# Patient Record
Sex: MALE | Race: WHITE | NOT HISPANIC OR LATINO | Employment: UNEMPLOYED | URBAN - METROPOLITAN AREA
[De-identification: names, ages, dates, MRNs, and addresses within clinical notes are randomized per-mention and may not be internally consistent; named-entity substitution may affect disease eponyms.]

---

## 2018-08-27 ENCOUNTER — HOSPITAL ENCOUNTER (EMERGENCY)
Facility: HOSPITAL | Age: 53
Discharge: HOME | End: 2018-08-27
Attending: FAMILY MEDICINE
Payer: MEDICAID

## 2018-08-27 VITALS
WEIGHT: 242.19 LBS | HEIGHT: 72 IN | SYSTOLIC BLOOD PRESSURE: 169 MMHG | TEMPERATURE: 98 F | BODY MASS INDEX: 32.8 KG/M2 | OXYGEN SATURATION: 97 % | RESPIRATION RATE: 20 BRPM | DIASTOLIC BLOOD PRESSURE: 93 MMHG | HEART RATE: 56 BPM

## 2018-08-27 DIAGNOSIS — M79.672 BILATERAL FOOT PAIN: ICD-10-CM

## 2018-08-27 DIAGNOSIS — M79.671 BILATERAL FOOT PAIN: ICD-10-CM

## 2018-08-27 DIAGNOSIS — L60.8 NAIL DISCOLORATION: Primary | ICD-10-CM

## 2018-08-27 PROCEDURE — 99283 EMERGENCY DEPT VISIT LOW MDM: CPT

## 2018-08-27 NOTE — ED PROVIDER NOTES
SCRIBE #1 NOTE: I, Sudeep Mathis, am scribing for, and in the presence of, Frankie Martinez PA-C. I have scribed the entire note.      History      Chief Complaint   Patient presents with    Wound Check     reports being diabetic x 15 yrs, reports bilateral toes are darkish blue colored        Review of patient's allergies indicates:  No Known Allergies     HPI   HPI    8/27/2018, 3:41 PM   History obtained from the patient      History of Present Illness: Adalberto Mccall is a 52 y.o. male patient with a hx of DM who presents to the Emergency Department for a wound check which onset gradually a few days ago. Pt reports a few weeks ago he noticed a black/blue coloring to his toes. Pt reports a hx of issues with neuropathy. Pt states he decided to come in for further evaluation. Symptoms are constant and moderate in severity. Exacerbated by nothing and relieved by nothing. No associated sxs. Patient denies any fever, chills, CP,SOB, trauma, gait problem, weakness/numbness, and all other sxs at this time. Pt denies tx PTA. No further complaints or concerns at this time.         Arrival mode: Personal vehicle    PCP: Corbin Cardoso MD       Past Medical History:  No past medical history on file.    Past Surgical History:  No past surgical history on file.      Family History:  No family history on file.    Social History:  Social History     Tobacco Use    Smoking status: Not on file   Substance and Sexual Activity    Alcohol use: Not on file    Drug use: Not on file    Sexual activity: Not on file       ROS   Review of Systems   Constitutional: Negative for chills and fever.   HENT: Negative for congestion, sore throat and trouble swallowing.    Respiratory: Negative for cough and shortness of breath.    Cardiovascular: Negative for chest pain.   Gastrointestinal: Negative for abdominal pain, nausea and vomiting.   Genitourinary: Negative for dysuria, frequency, hematuria and urgency.   Musculoskeletal:  Negative for back pain, gait problem, joint swelling and neck pain.        (-) Trauma   Skin: Negative for pallor, rash and wound.        (+) Blue/black coloring of toes bilaterally  (-) Increased warmth  (-) Erythema  (-) Swelling to the area   (-) Drainage from the site   Neurological: Negative for weakness, light-headedness, numbness and headaches.        (-)Tingling  (-) Paresthesia     Physical Exam      Initial Vitals [08/27/18 1520]   BP Pulse Resp Temp SpO2   (!) 169/93 (!) 56 20 98.2 °F (36.8 °C) 97 %      MAP       --          Physical Exam  Nursing Notes and Vital Signs Reviewed.  Constitutional: Patient is in no acute distress. Well-developed and well-nourished.  Head: Atraumatic. Normocephalic.  Eyes: PERRL. EOM intact. Conjunctivae are not pale. No scleral icterus.  ENT: Mucous membranes are moist. Oropharynx is clear and symmetric.    Neck: Supple. Full ROM. No lymphadenopathy.  Cardiovascular: Regular rate. Regular rhythm.  Pulmonary/Chest: No respiratory distress. Clear to auscultation bilaterally.  Abdominal: Soft and non-distended.  There is no tenderness.   Musculoskeletal: Moves all extremities. No obvious deformities. Blue/green discoloration noted to bilateral feet. No bruising, swelling, necrosis, erythema, or other signs of trauma.   Skin: Warm and dry.  Neurological:  Alert, awake, and appropriate.  Normal speech.  No acute focal neurological deficits are appreciated.  Psychiatric: Normal affect. Good eye contact. Appropriate in content.    ED Course    Procedures  ED Vital Signs:  Vitals:    08/27/18 1520   BP: (!) 169/93   Pulse: (!) 56   Resp: 20   Temp: 98.2 °F (36.8 °C)   TempSrc: Oral   SpO2: 97%   Weight: 109.8 kg (242 lb 2.8 oz)   Height: 6' (1.829 m)       Imaging Results:  Imaging Results          US Lower Extremity Arteries Bilateral (Final result)  Result time 08/27/18 16:50:18    Final result by Vern Turner MD (08/27/18 16:50:18)                 Impression:      No  hemodynamically significant stenosis demonstrated in the right or left lower extremity arterial system.      Electronically signed by: Vern Turner MD  Date:    08/27/2018  Time:    16:50             Narrative:    EXAMINATION:  US LOWER EXTREMITY ARTERIES BILATERAL    CLINICAL HISTORY:  Pain in right foot    TECHNIQUE:  Bilateral lower extremity arterial duplex ultrasound examination performed. Multiple gray scale and color doppler images were obtained in addition to waveform analysis.    COMPARISON:  None    FINDINGS:  The peak systolic velocities on the right are as follows, in centimeters/second:    Common femoral artery: 146    Superficial femoral artery, proximal: 89    Superficial femoral artery, distal: 106/83    Popliteal artery: 63    Peroneal artery: 134    Dorsalis pedis artery: 35    Anterior tibial artery: 91    The peak systolic velocities on the left are as follows, in centimeters/second:    Common femoral artery: 126    Superficial femoral artery, proximal: 84    Superficial femoral artery, distal: 95.7/76    Popliteal artery: 66    Posterior tibial artery: 98    Dorsalis pedis artery: 27    Anterior tibial artery: 47    Normal arterial waveforms are demonstrated.                                        The Emergency Provider reviewed the vital signs and test results, which are outlined above.    ED Discussion     5:02 PM: Reassessed pt at this time. Pt is awake, alert, and in NAD. Pt states his condition has improved at this time. Discussed with pt all pertinent ED information and results. Discussed pt dx and plan of tx. Gave pt all f/u and return to the ED instructions. All questions and concerns were addressed at this time. Pt expresses understanding of information and instructions, and is comfortable with plan to discharge. Pt is stable for discharge.    I discussed with patient and/or family/caretaker that evaluation in the ED does not suggest any emergent or life threatening medical conditions  requiring immediate intervention beyond what was provided in the ED, and I believe patient is safe for discharge.  Regardless, an unremarkable evaluation in the ED does not preclude the development or presence of a serious of life threatening condition. As such, patient was instructed to return immediately for any worsening or change in current symptoms.      ED Medication(s):  Medications - No data to display    New Prescriptions    No medications on file       Follow-up Information     Corbin Cardoso MD. Go in 2 days.    Specialty:  Endocrinology  Contact information:  [address] [place] [id number]                     Medical Decision Making    Medical Decision Making:   Clinical Tests:   Radiological Study: Ordered and Reviewed           Scribe Attestation:   Scribe #1: I performed the above scribed service and the documentation accurately describes the services I performed. I attest to the accuracy of the note.    Attending:   Physician Attestation Statement for Scribe #1: I, Frankie Martinez PA-C, personally performed the services described in this documentation, as scribed by Sudeep Mathis, in my presence, and it is both accurate and complete.          Clinical Impression       ICD-10-CM ICD-9-CM   1. Nail discoloration L60.8 703.8   2. Bilateral foot pain M79.671 729.5    M79.672        Disposition:   Disposition: Discharged  Condition: Stable         RAD Brady  08/27/18 9418

## 2018-08-27 NOTE — ED NOTES
Bed: [facility]  Expected date:   Expected time:   Means of arrival:   Comments:     Marcelo Cm RN  08/27/18 5660

## 2019-02-21 ENCOUNTER — HOSPITAL ENCOUNTER (EMERGENCY)
Facility: HOSPITAL | Age: 54
Discharge: HOME | End: 2019-02-21
Attending: EMERGENCY MEDICINE
Payer: MEDICAID

## 2019-02-21 VITALS
HEIGHT: 72 IN | SYSTOLIC BLOOD PRESSURE: 153 MMHG | WEIGHT: 240.75 LBS | HEART RATE: 86 BPM | BODY MASS INDEX: 32.61 KG/M2 | DIASTOLIC BLOOD PRESSURE: 87 MMHG | OXYGEN SATURATION: 96 % | RESPIRATION RATE: 16 BRPM | TEMPERATURE: 100 F

## 2019-02-21 DIAGNOSIS — J32.9 SINUSITIS, UNSPECIFIED CHRONICITY, UNSPECIFIED LOCATION: Primary | ICD-10-CM

## 2019-02-21 LAB
INFLUENZA A, MOLECULAR: NEGATIVE
INFLUENZA B, MOLECULAR: NEGATIVE
SPECIMEN SOURCE: NORMAL

## 2019-02-21 PROCEDURE — 99284 EMERGENCY DEPT VISIT MOD MDM: CPT

## 2019-02-21 PROCEDURE — 87502 INFLUENZA DNA AMP PROBE: CPT

## 2019-02-21 RX ORDER — AMOXICILLIN AND CLAVULANATE POTASSIUM 875; 125 MG/1; MG/1
1 TABLET, FILM COATED ORAL 2 TIMES DAILY
Qty: 20 TABLET | Refills: 0 | OUTPATIENT
Start: 2019-02-21 | End: 2019-08-10

## 2019-02-21 RX ORDER — METHYLPREDNISOLONE 4 MG/1
TABLET ORAL
Qty: 1 PACKAGE | Refills: 0 | OUTPATIENT
Start: 2019-02-21 | End: 2019-08-10

## 2019-02-21 RX ORDER — NEOMYCIN SULFATE, POLYMYXIN B SULFATE, HYDROCORTISONE 3.5; 10000; 1 MG/ML; [USP'U]/ML; MG/ML
4 SOLUTION/ DROPS AURICULAR (OTIC) 3 TIMES DAILY
Qty: 1 BOTTLE | Refills: 0 | Status: SHIPPED | OUTPATIENT
Start: 2019-02-21 | End: 2019-03-03

## 2019-02-22 NOTE — ED PROVIDER NOTES
"SCRIBE #1 NOTE: I, Damien Espinal, am scribing for, and in the presence of, RAD Willis. I have scribed the entire note.       History     Chief Complaint   Patient presents with    Otalgia     Pain for about 1 week, now with fever and congestion.     Review of patient's allergies indicates:  No Known Allergies      History of Present Illness     HPI    2/21/2019, 7:10 PM  History obtained from the patient      History of Present Illness: Adalberto Menendez is a 53 y.o. male patient who presents to the Emergency Department for evaluation of bilateral ear pain which onset gradually about x1 week ago. Pt states "it feels like I have water in my ears." Symptoms are constant and moderate in severity. No mitigating or exacerbating factors reported. Associated sxs include fever (Temp 101 this morning), diaphoresis, nasal congestion, rhinorrhea, and sinus pressure, which all onset gradually x2 days ago. Patient denies any ear discharge, sneezing, sore throat, SOB, cough, n/v/d, HA, generalized myalgias, and all other sxs at this time. No further complaints or concerns at this time.         Arrival mode: Personal vehicle     PCP: Corbin Cardoso MD        Past Medical History:  History reviewed. No pertinent medical history.       Past Surgical History:  History reviewed. No pertinent surgical history.        Family History:  History reviewed. No pertinent family history.       Social History:  Social History     Tobacco Use    Smoking status: Unknown   Substance and Sexual Activity    Alcohol use: Unknown    Drug use: Unknown    Sexual activity: Unknown        Review of Systems     Review of Systems   Constitutional: Positive for diaphoresis and fever (101).   HENT: Positive for congestion, ear pain (bilateral), rhinorrhea and sinus pressure. Negative for ear discharge, facial swelling, sneezing, sore throat and trouble swallowing.    Eyes: Negative for photophobia and visual disturbance.   Respiratory: Negative for " cough, shortness of breath and wheezing.    Cardiovascular: Negative for chest pain.   Gastrointestinal: Negative for abdominal pain, diarrhea, nausea and vomiting.   Genitourinary: Negative for dysuria, flank pain and frequency.   Musculoskeletal: Negative for back pain and myalgias (generalized).   Skin: Negative for rash.   Neurological: Negative for dizziness, weakness, light-headedness, numbness and headaches.   Hematological: Does not bruise/bleed easily.   All other systems reviewed and are negative.       Physical Exam     Initial Vitals [02/21/19 1907]   BP Pulse Resp Temp SpO2   (!) 153/87 86 16 99.5 °F (37.5 °C) 96 %      MAP       --          Physical Exam  Nursing Notes and Vital Signs Reviewed.  Constitutional: Patient is in no acute distress. Well-developed and well-nourished.  Head: Atraumatic. Normocephalic.  Eyes: PERRL. EOM intact. Conjunctivae are not pale. No scleral icterus.  Ears: Right TM normal. Left TM normal. No erythema. No bulging. Middle effusion on R ear. No erythema. No perforation.   Nose: Patent nares. Turbinates are normal. Maxillary sinus tenderness bilaterally. Rhinorrhea noted.   Throat: Moist mucous membranes. Posterior oropharynx is symmetric without erythema. Tonsillar exudate is not present. No trismus. Normal handling of secretions. No stridor.  Neck: Supple. Full ROM. No lymphadenopathy.  Cardiovascular: Regular rate. Regular rhythm. No murmurs, rubs, or gallops. Distal pulses are 2+ and symmetric.  Pulmonary/Chest: No respiratory distress. Clear to auscultation bilaterally. No wheezing or rales.  Abdominal: Soft and non-distended.  There is no tenderness.  No rebound, guarding, or rigidity. Good bowel sounds.  Genitourinary: No CVA tenderness  Musculoskeletal: Moves all extremities. No obvious deformities. No edema. No calf tenderness.  Skin: Warm and dry.  Neurological:  Alert, awake, and appropriate.  Normal speech.  No acute focal neurological deficits are  appreciated.  Psychiatric: Normal affect. Good eye contact. Appropriate in content.     ED Course   Procedures  ED Vital Signs:  Vitals:    02/21/19 1907   BP: (!) 153/87   Pulse: 86   Resp: 16   Temp: 99.5 °F (37.5 °C)   TempSrc: Oral   SpO2: 96%   Weight: 109.2 kg (240 lb 11.9 oz)   Height: 6' (1.829 m)       Abnormal Lab Results:  Labs Reviewed   INFLUENZA A & B BY MOLECULAR        All Lab Results:  Results for orders placed or performed during the hospital encounter of 02/21/19   Influenza A & B by Molecular   Result Value Ref Range    Influenza A, Molecular Negative Negative    Influenza B, Molecular Negative Negative    Flu A & B Source Nasal swab               The Emergency Provider reviewed the vital signs and test results, which are outlined above.     ED Discussion     8:11 PM: Reassessed pt at this time. Discussed with pt all pertinent ED information and results. Discussed pt dx and plan of tx. Gave pt all f/u and return to the ED instructions. All questions and concerns were addressed at this time. Pt expresses understanding of information and instructions, and is comfortable with plan to discharge. Pt is stable for discharge.    I discussed with patient and/or family/caretaker that evaluation in the ED does not suggest any emergent or life threatening medical conditions requiring immediate intervention beyond what was provided in the ED, and I believe patient is safe for discharge.  Regardless, an unremarkable evaluation in the ED does not preclude the development or presence of a serious of life threatening condition. As such, patient was instructed to return immediately for any worsening or change in current symptoms.      New Prescriptions    AMOXICILLIN-CLAVULANATE 875-125MG (AUGMENTIN) 875-125 MG PER TABLET    Take 1 tablet by mouth 2 (two) times daily.    METHYLPREDNISOLONE (MEDROL DOSEPACK) 4 MG TABLET    Use as directed       Follow-up Information     Corbin Cardoso MD In 1 week.     Specialty:  Endocrinology  Why:  If symptoms worsen  Contact information:  [address] [facility] [address]  [facility] [address]                         Medical Decision Making:   Clinical Tests:   Lab Tests: Ordered and Reviewed             Scribe Attestation:   Scribe #1: I performed the above scribed service and the documentation accurately describes the services I performed. I attest to the accuracy of the note.     Attending:   Physician Attestation Statement for Scribe #1: I, RAD Willis, personally performed the services described in this documentation, as scribed by Damien Espinal, in my presence, and it is both accurate and complete.           Clinical Impression       ICD-10-CM ICD-9-CM   1. Sinusitis, unspecified chronicity, unspecified location J32.9 473.9       Disposition:   Disposition: Discharged  Condition: Stable         RAD Carreno  02/21/19 2209

## 2019-08-10 ENCOUNTER — HOSPITAL ENCOUNTER (EMERGENCY)
Facility: HOSPITAL | Age: 54
Discharge: HOME | End: 2019-08-10
Attending: EMERGENCY MEDICINE
Payer: MEDICAID

## 2019-08-10 VITALS
DIASTOLIC BLOOD PRESSURE: 79 MMHG | BODY MASS INDEX: 32.52 KG/M2 | HEIGHT: 72 IN | WEIGHT: 240.06 LBS | OXYGEN SATURATION: 98 % | RESPIRATION RATE: 18 BRPM | SYSTOLIC BLOOD PRESSURE: 144 MMHG | TEMPERATURE: 99 F | HEART RATE: 71 BPM

## 2019-08-10 DIAGNOSIS — S76.211A GROIN STRAIN, RIGHT, INITIAL ENCOUNTER: Primary | ICD-10-CM

## 2019-08-10 PROCEDURE — 99283 EMERGENCY DEPT VISIT LOW MDM: CPT

## 2019-08-10 RX ORDER — NAPROXEN 500 MG/1
500 TABLET ORAL 2 TIMES DAILY WITH MEALS
Qty: 30 TABLET | Refills: 0 | Status: SHIPPED | OUTPATIENT
Start: 2019-08-10

## 2019-08-10 RX ORDER — CYCLOBENZAPRINE HCL 10 MG
10 TABLET ORAL 3 TIMES DAILY PRN
Qty: 15 TABLET | Refills: 0 | Status: SHIPPED | OUTPATIENT
Start: 2019-08-10 | End: 2019-08-15

## 2019-08-10 NOTE — ED PROVIDER NOTES
SCRIBE #1 NOTE: I, Belinda Sen, am scribing for, and in the presence of, Demi Biggs MD. I have scribed the entire note.       History     Chief Complaint   Patient presents with    Groin Pain     R side x 3 days, has had hernia surgery in the past for the same issue     Review of patient's allergies indicates:  No Known Allergies      History of Present Illness     HPI    8/10/2019, 12:50 PM  History obtained from the patient      History of Present Illness: Adalberto Menendez is a 53 y.o. male patient with a PMHx of HTN, DM, PSHx of R inguinal hernia repair (10 years ago) who presents to the Emergency Department for evaluation of R groin pain which onset suddenly 2 days ago. Patient reports that when he went to lie down, he felt a shooting pain in his R inguinal region. He took laxatives because he thought the pain was associated with constipation. He had no pain relief. Pt reports that 10 years ago, he had a hernia repair in the same area. He denies any swelling or bulge to the area since the pain onset. The pain has been constant, moderate in severity, and worsens with movement and changes in position. No mitigating factors reported. Patient denies any associated fever, chills, abd pain, N/V/D, dysuria, hematuria, erythema, rash, scrotal pain/swelling, and all other sxs at this time      Arrival mode:Personal vehicle    PCP: Corbin Cardoso MD        Past Medical History:  Past Medical History:   Diagnosis Date    Diabetes mellitus     Hyperlipidemia     Hypertension        Past Surgical History:  Past Surgical History:   Procedure Laterality Date    HERNIA REPAIR           Family History:  History reviewed. No pertinent family history.    Social History:  Social History     Tobacco Use    Smoking status: Never Smoker   Substance and Sexual Activity    Alcohol use: Not Currently    Drug use: Not Currently    Sexual activity: Unknown        Review of Systems     Review of Systems    Constitutional: Negative for chills and fever.   HENT: Negative for sore throat.    Respiratory: Negative for shortness of breath.    Cardiovascular: Negative for chest pain.   Gastrointestinal: Negative for abdominal pain, diarrhea, nausea and vomiting.   Genitourinary: Negative for discharge, dysuria, flank pain, hematuria, penile pain, penile swelling, scrotal swelling and testicular pain.   Musculoskeletal: Negative for back pain.        (+) R groin pain, (-) swelling   Skin: Negative for color change, rash and wound.   Neurological: Negative for weakness.   Hematological: Does not bruise/bleed easily.   All other systems reviewed and are negative.     Physical Exam     Initial Vitals [08/10/19 1241]   BP Pulse Resp Temp SpO2   (!) 144/79 71 18 98.8 °F (37.1 °C) 98 %      MAP       --          Physical Exam  Nursing Notes and Vital Signs Reviewed.  Constitutional: Patient is in no acute distress. Well-developed and well-nourished.  Head: Atraumatic. Normocephalic.  Eyes: PERRL. EOM intact. Conjunctivae are not pale. No scleral icterus.  ENT: Mucous membranes are moist. Oropharynx is clear and symmetric.    Neck: Supple. Full ROM. No lymphadenopathy.  Cardiovascular: Regular rate. Regular rhythm. No murmurs, rubs, or gallops.  Pulmonary/Chest: No respiratory distress. Clear to auscultation bilaterally. No wheezing or rales.  Abdominal: Soft and non-distended.  There is no tenderness.  No rebound, guarding, or rigidity. Good bowel sounds.  : No palpable mass or hernia to the inguinal region. No LAD. No erythema, rash, or lesions. No bruit. Normal  Exam. Normal bilateral testicular lie and position. Scrotum and testes appear normal with no discoloration. No scrotal, testicular, or epididymal tenderness. No masses or hernias around the scrotum, testicles, or inguinal canal.   Musculoskeletal: Moves all extremities. No obvious deformities.   Skin: Warm and dry.  Neurological:  Alert, awake, and appropriate.   Normal speech.  No acute focal neurological deficits are appreciated.  Psychiatric: Normal affect. Good eye contact. Appropriate in content.     ED Course   Procedures  ED Vital Signs:  Vitals:    08/10/19 1241   BP: (!) 144/79   Pulse: 71   Resp: 18   Temp: 98.8 °F (37.1 °C)   TempSrc: Oral   SpO2: 98%   Weight: 108.9 kg (240 lb 1.3 oz)   Height: 6' (1.829 m)            The Emergency Provider reviewed the vital signs and test results, which are outlined above.     ED Discussion     1:13 PM: Reassessed pt at this time. Discussed pt dx of R groin strain and plan to prescribe a muscle relaxer and NSAID. Recommended icing the area and to avoid any heavy lifting or exercise. Gave pt all f/u and return to the ED instructions. Pt informed to monitor area and to return to ED if the pain worsens, he develops fever, or he develops a bulge to the area. All questions and concerns were addressed at this time. Pt expresses understanding of information and instructions, and is comfortable with plan to discharge. Pt is stable for discharge.    I discussed with patient and/or family/caretaker that evaluation in the ED does not suggest any emergent or life threatening medical conditions requiring immediate intervention beyond what was provided in the ED, and I believe patient is safe for discharge.  Regardless, an unremarkable evaluation in the ED does not preclude the development or presence of a serious of life threatening condition. As such, patient was instructed to return immediately for any worsening or change in current symptoms.    ED Medication(s):  Medications - No data to display    Discharge Medication List as of 8/10/2019  1:11 PM      START taking these medications    Details   cyclobenzaprine (FLEXERIL) 10 MG tablet Take 1 tablet (10 mg total) by mouth 3 (three) times daily as needed., Starting Sat 8/10/2019, Until Thu 8/15/2019, Print      naproxen (NAPROSYN) 500 MG tablet Take 1 tablet (500 mg total) by mouth 2  (two) times daily with meals., Starting Sat 8/10/2019, Print           Follow-up Information     Corbin Cardoso MD. Schedule an appointment as soon as possible for a visit in 2 days.    Specialty:  Endocrinology  Why:  Return to the Emergency Room, If symptoms worsen  Contact information:  [address] [place] [phone]                           Scribe Attestation:   Scribe #1: I performed the above scribed service and the documentation accurately describes the services I performed. I attest to the accuracy of the note.     Attending:   Physician Attestation Statement for Scribe #1: I, Demi Biggs MD, personally performed the services described in this documentation, as scribed by Belinda Sen, in my presence, and it is both accurate and complete.           Clinical Impression       ICD-10-CM ICD-9-CM   1. Groin strain, right, initial encounter S76.211A 848.8       Disposition:   Disposition: Discharged  Condition: Stable         Demi Biggs MD  08/10/19 9302

## 2019-08-10 NOTE — ED NOTES
Patient examined, evaluated, and educated on discharge instructions and prescriptions by Dr. Biggs without nursing assistance. Patient discharge to lobby by provider.

## 2021-12-19 ENCOUNTER — HOSPITAL ENCOUNTER (EMERGENCY)
Facility: HOSPITAL | Age: 56
Discharge: HOME | End: 2021-12-19
Attending: EMERGENCY MEDICINE
Payer: MEDICAID

## 2021-12-19 VITALS
SYSTOLIC BLOOD PRESSURE: 150 MMHG | WEIGHT: 234.69 LBS | TEMPERATURE: 100 F | OXYGEN SATURATION: 97 % | RESPIRATION RATE: 20 BRPM | HEIGHT: 72 IN | DIASTOLIC BLOOD PRESSURE: 80 MMHG | HEART RATE: 76 BPM | BODY MASS INDEX: 31.79 KG/M2

## 2021-12-19 DIAGNOSIS — U07.1 COVID-19: Primary | ICD-10-CM

## 2021-12-19 PROCEDURE — 99281 EMR DPT VST MAYX REQ PHY/QHP: CPT

## 2021-12-20 ENCOUNTER — INFUSION (OUTPATIENT)
Dept: INFECTIOUS DISEASES | Facility: HOSPITAL | Age: 56
End: 2021-12-20
Attending: INTERNAL MEDICINE
Payer: MEDICAID

## 2021-12-20 VITALS
TEMPERATURE: 98 F | HEART RATE: 77 BPM | DIASTOLIC BLOOD PRESSURE: 85 MMHG | SYSTOLIC BLOOD PRESSURE: 144 MMHG | RESPIRATION RATE: 17 BRPM | OXYGEN SATURATION: 97 %

## 2021-12-20 DIAGNOSIS — U07.1 COVID-19: ICD-10-CM

## 2021-12-20 PROCEDURE — 63600175 PHARM REV CODE 636 W HCPCS: Performed by: INTERNAL MEDICINE

## 2021-12-20 PROCEDURE — M0243 CASIRIVI AND IMDEVI INFUSION: HCPCS | Performed by: INTERNAL MEDICINE

## 2021-12-20 PROCEDURE — 25000003 PHARM REV CODE 250: Performed by: INTERNAL MEDICINE

## 2021-12-20 RX ORDER — SODIUM CHLORIDE 0.9 % (FLUSH) 0.9 %
10 SYRINGE (ML) INJECTION
Status: ACTIVE | OUTPATIENT
Start: 2021-12-20

## 2021-12-20 RX ORDER — ACETAMINOPHEN 325 MG/1
650 TABLET ORAL ONCE AS NEEDED
Status: ACTIVE | OUTPATIENT
Start: 2021-12-20 | End: 2033-05-18

## 2021-12-20 RX ORDER — ONDANSETRON 4 MG/1
4 TABLET, ORALLY DISINTEGRATING ORAL ONCE AS NEEDED
Status: ACTIVE | OUTPATIENT
Start: 2021-12-20 | End: 2033-05-18

## 2021-12-20 RX ORDER — ALBUTEROL SULFATE 90 UG/1
2 AEROSOL, METERED RESPIRATORY (INHALATION)
Status: ACTIVE | OUTPATIENT
Start: 2021-12-20

## 2021-12-20 RX ORDER — EPINEPHRINE 0.3 MG/.3ML
0.3 INJECTION SUBCUTANEOUS
Status: ACTIVE | OUTPATIENT
Start: 2021-12-20

## 2021-12-20 RX ORDER — DIPHENHYDRAMINE HYDROCHLORIDE 50 MG/ML
25 INJECTION INTRAMUSCULAR; INTRAVENOUS ONCE AS NEEDED
Status: ACTIVE | OUTPATIENT
Start: 2021-12-20 | End: 2033-05-18

## 2021-12-20 RX ADMIN — CASIRIVIMAB AND IMDEVIMAB 600 MG: 600; 600 INJECTION, SOLUTION, CONCENTRATE INTRAVENOUS at 11:12
